# Patient Record
Sex: FEMALE | Race: WHITE
[De-identification: names, ages, dates, MRNs, and addresses within clinical notes are randomized per-mention and may not be internally consistent; named-entity substitution may affect disease eponyms.]

---

## 2022-09-07 ENCOUNTER — HOSPITAL ENCOUNTER (OUTPATIENT)
Dept: HOSPITAL 4 - SED | Age: 62
Setting detail: OBSERVATION
LOS: 1 days | Discharge: HOME | End: 2022-09-08
Attending: INTERNAL MEDICINE | Admitting: INTERNAL MEDICINE
Payer: COMMERCIAL

## 2022-09-07 VITALS — WEIGHT: 138 LBS | BODY MASS INDEX: 26.06 KG/M2 | HEIGHT: 61 IN

## 2022-09-07 VITALS — SYSTOLIC BLOOD PRESSURE: 150 MMHG

## 2022-09-07 DIAGNOSIS — Z20.822: ICD-10-CM

## 2022-09-07 DIAGNOSIS — R51.9: ICD-10-CM

## 2022-09-07 DIAGNOSIS — M31.6: ICD-10-CM

## 2022-09-07 DIAGNOSIS — R68.84: Primary | ICD-10-CM

## 2022-09-07 LAB
ALBUMIN SERPL BCP-MCNC: 3.7 G/DL (ref 3.4–4.8)
ALT SERPL W P-5'-P-CCNC: 19 U/L (ref 12–78)
ANION GAP SERPL CALCULATED.3IONS-SCNC: 8 MMOL/L (ref 5–15)
AST SERPL W P-5'-P-CCNC: 21 U/L (ref 10–37)
BASOPHILS # BLD AUTO: 0 K/UL (ref 0–0.2)
BASOPHILS NFR BLD AUTO: 0.8 % (ref 0–2)
BILIRUB SERPL-MCNC: 0.4 MG/DL (ref 0–1)
BUN SERPL-MCNC: 13 MG/DL (ref 8–21)
CALCIUM SERPL-MCNC: 8.9 MG/DL (ref 8.4–11)
CHLORIDE SERPL-SCNC: 105 MMOL/L (ref 98–107)
CREAT SERPL-MCNC: 0.89 MG/DL (ref 0.55–1.3)
EOSINOPHIL # BLD AUTO: 0 K/UL (ref 0–0.4)
EOSINOPHIL NFR BLD AUTO: 0.7 % (ref 0–4)
ERYTHROCYTE [DISTWIDTH] IN BLOOD BY AUTOMATED COUNT: 12.3 % (ref 9–15)
GFR SERPL CREATININE-BSD FRML MDRD: 83 ML/MIN (ref 90–?)
GLUCOSE SERPL-MCNC: 102 MG/DL (ref 70–99)
HCT VFR BLD AUTO: 36 % (ref 36–48)
HGB BLD-MCNC: 12.2 G/DL (ref 12–16)
LYMPHOCYTES # BLD AUTO: 0.9 K/UL (ref 1–5.5)
LYMPHOCYTES NFR BLD AUTO: 24.1 % (ref 20.5–51.5)
MCH RBC QN AUTO: 33 PG (ref 27–31)
MCHC RBC AUTO-ENTMCNC: 34 % (ref 32–36)
MCV RBC AUTO: 96 FL (ref 79–98)
MONOCYTES # BLD MANUAL: 0.4 K/UL (ref 0–1)
MONOCYTES # BLD MANUAL: 9 % (ref 1.7–9.3)
NEUTROPHILS # BLD AUTO: 2.6 K/UL (ref 1.8–7.7)
NEUTROPHILS NFR BLD AUTO: 65.4 % (ref 40–70)
PLATELET # BLD AUTO: 149 K/UL (ref 130–430)
POTASSIUM SERPL-SCNC: 4.6 MMOL/L (ref 3.5–5.1)
RBC # BLD AUTO: 3.74 MIL/UL (ref 4.2–6.2)
SODIUM SERPLBLD-SCNC: 141 MMOL/L (ref 136–145)
WBC # BLD AUTO: 3.9 K/UL (ref 4.8–10.8)

## 2022-09-07 PROCEDURE — 99285 EMERGENCY DEPT VISIT HI MDM: CPT

## 2022-09-07 PROCEDURE — 36415 COLL VENOUS BLD VENIPUNCTURE: CPT

## 2022-09-07 PROCEDURE — 93005 ELECTROCARDIOGRAM TRACING: CPT

## 2022-09-07 PROCEDURE — 87426 SARSCOV CORONAVIRUS AG IA: CPT

## 2022-09-07 PROCEDURE — 70487 CT MAXILLOFACIAL W/DYE: CPT

## 2022-09-07 PROCEDURE — 96365 THER/PROPH/DIAG IV INF INIT: CPT

## 2022-09-07 PROCEDURE — 80053 COMPREHEN METABOLIC PANEL: CPT

## 2022-09-07 PROCEDURE — 86140 C-REACTIVE PROTEIN: CPT

## 2022-09-07 PROCEDURE — 85025 COMPLETE CBC W/AUTO DIFF WBC: CPT

## 2022-09-07 PROCEDURE — 96376 TX/PRO/DX INJ SAME DRUG ADON: CPT

## 2022-09-07 PROCEDURE — 96372 THER/PROPH/DIAG INJ SC/IM: CPT

## 2022-09-07 PROCEDURE — 84484 ASSAY OF TROPONIN QUANT: CPT

## 2022-09-07 PROCEDURE — 96366 THER/PROPH/DIAG IV INF ADDON: CPT

## 2022-09-07 PROCEDURE — 70360 X-RAY EXAM OF NECK: CPT

## 2022-09-07 PROCEDURE — 85651 RBC SED RATE NONAUTOMATED: CPT

## 2022-09-07 PROCEDURE — 96375 TX/PRO/DX INJ NEW DRUG ADDON: CPT

## 2022-09-07 PROCEDURE — 70491 CT SOFT TISSUE NECK W/DYE: CPT

## 2022-09-07 PROCEDURE — 83880 ASSAY OF NATRIURETIC PEPTIDE: CPT

## 2022-09-07 PROCEDURE — G0378 HOSPITAL OBSERVATION PER HR: HCPCS

## 2022-09-07 PROCEDURE — 71045 X-RAY EXAM CHEST 1 VIEW: CPT

## 2022-09-07 PROCEDURE — 76376 3D RENDER W/INTRP POSTPROCES: CPT

## 2022-09-07 NOTE — NUR
Dr. Marie made aware of BP of 172/99. No new orders at the this time. Pt 
connected to monitor and safety precautions in place. Pt states her pain has 
subsided after pain medication was administered.

## 2022-09-07 NOTE — NUR
PT A&O x4, following commands, and safety precautions in place. Pt states she 
feels okay with slight pain but it is tolerable. VSS.

## 2022-09-07 NOTE — NUR
Placed in room 03  . Placed on cardiac monitor, blood pressure machine and 
pulse oximeter. To gown for exam. Side rails up.

Report given to JI ARAMBULA

## 2022-09-07 NOTE — NUR
-------------------------------------------------------------------------------

           *** Note undone in [facility] - 09/07/22 at 2324 by [id number] ***            

-------------------------------------------------------------------------------

Lab reported ESR of 52. Dr. Pan made aware.

## 2022-09-07 NOTE — NUR
-------------------------------------------------------------------------------

          *** Note undone in [facility] - 09/07/22 at 1940 by [email or web address] ***           

-------------------------------------------------------------------------------

Rec report from Kelly FERNANDEZ

## 2022-09-07 NOTE — NUR
Admit bed requested 



Patient will be admitted to care of Dr. Garcia.  

Admitted to med surg unit.

Diagnosis RULE OUT TEMPORAL ARTERITIS

Inpatient (Yes or No)  YES

Observation (Yes or No) YES

Orientation concerns or request close to nursing station  (Yes or No) NO

Covid Status NEGATIVE

On vent or bipap NO

Isolation requirements NO

Needs a sitter NO

From Home (Yes or if No enter name of facility) YES

Requires Dialysis (Yes or No) NO

Med Rec Completed (Yes of No) YES

## 2022-09-07 NOTE — NUR
----- Message from Roshan Martines III, MD sent at 7/10/2020  4:06 PM EDT -----  Luh- let's set up a telehealth visit to review,any afternoon Tues-Friday next week would be fine   PATIENT AMBULATORY TO ROOM 3 C/O LEFT JAW PAIN, SEEN BY EDP WITH ORDER LAMBERTO 
OUT. PATIENT AAOX4 SPEECH CLEAR AND COHERENT.

## 2022-09-08 VITALS — SYSTOLIC BLOOD PRESSURE: 124 MMHG

## 2022-09-08 VITALS — SYSTOLIC BLOOD PRESSURE: 100 MMHG

## 2022-09-08 VITALS — SYSTOLIC BLOOD PRESSURE: 120 MMHG

## 2022-09-08 VITALS — SYSTOLIC BLOOD PRESSURE: 148 MMHG

## 2022-09-08 LAB
ALBUMIN SERPL BCP-MCNC: 3.5 G/DL (ref 3.4–4.8)
ALT SERPL W P-5'-P-CCNC: 24 U/L (ref 12–78)
ANION GAP SERPL CALCULATED.3IONS-SCNC: 11 MMOL/L (ref 5–15)
AST SERPL W P-5'-P-CCNC: 19 U/L (ref 10–37)
BASOPHILS # BLD AUTO: 0 K/UL (ref 0–0.2)
BASOPHILS NFR BLD AUTO: 0.2 % (ref 0–2)
BILIRUB SERPL-MCNC: 0.4 MG/DL (ref 0–1)
BUN SERPL-MCNC: 15 MG/DL (ref 8–21)
CALCIUM SERPL-MCNC: 9.1 MG/DL (ref 8.4–11)
CHLORIDE SERPL-SCNC: 102 MMOL/L (ref 98–107)
CREAT SERPL-MCNC: 0.82 MG/DL (ref 0.55–1.3)
EOSINOPHIL # BLD AUTO: 0 K/UL (ref 0–0.4)
EOSINOPHIL NFR BLD AUTO: 0.1 % (ref 0–4)
ERYTHROCYTE [DISTWIDTH] IN BLOOD BY AUTOMATED COUNT: 12.4 % (ref 9–15)
GFR SERPL CREATININE-BSD FRML MDRD: 91 ML/MIN (ref 90–?)
GLUCOSE SERPL-MCNC: 159 MG/DL (ref 70–99)
HCT VFR BLD AUTO: 36.1 % (ref 36–48)
HGB BLD-MCNC: 12.2 G/DL (ref 12–16)
LYMPHOCYTES # BLD AUTO: 0.4 K/UL (ref 1–5.5)
LYMPHOCYTES NFR BLD AUTO: 14.9 % (ref 20.5–51.5)
MCH RBC QN AUTO: 32 PG (ref 27–31)
MCHC RBC AUTO-ENTMCNC: 34 % (ref 32–36)
MCV RBC AUTO: 95 FL (ref 79–98)
MONOCYTES # BLD MANUAL: 0 K/UL (ref 0–1)
MONOCYTES # BLD MANUAL: 1.3 % (ref 1.7–9.3)
NEUTROPHILS # BLD AUTO: 2.2 K/UL (ref 1.8–7.7)
NEUTROPHILS NFR BLD AUTO: 83.5 % (ref 40–70)
PLATELET # BLD AUTO: 157 K/UL (ref 130–430)
POTASSIUM SERPL-SCNC: 3.9 MMOL/L (ref 3.5–5.1)
RBC # BLD AUTO: 3.81 MIL/UL (ref 4.2–6.2)
SODIUM SERPLBLD-SCNC: 138 MMOL/L (ref 136–145)
WBC # BLD AUTO: 2.7 K/UL (ref 4.8–10.8)
WBC NRBC COR # BLD AUTO: 2.7 K/UL (ref 4.5–11)

## 2022-09-08 RX ADMIN — METHYLPREDNISOLONE SODIUM SUCCINATE SCH MLS/HR: 1 INJECTION, POWDER, FOR SOLUTION INTRAMUSCULAR; INTRAVENOUS at 09:00

## 2022-09-08 RX ADMIN — METHYLPREDNISOLONE SODIUM SUCCINATE SCH MLS/HR: 1 INJECTION, POWDER, FOR SOLUTION INTRAMUSCULAR; INTRAVENOUS at 10:38

## 2022-09-08 NOTE — NUR
Patient will be admitted to care of Dr. Garcia.  Admitted to med surg unit.  
Will go to room 109A.  Belongings list completed.  Complete and up to date 
summary report printed. SBAR report given to Domingo WORKMAN at bedside with 
opportunity for questions.

## 2022-09-14 NOTE — NUR
IV ADMINISTRATION END TIME (Observation Patients ONLY):

Late entry

IV infusion of Methylprenisolone Sodium Succinate/Dextrose started at 09:00 on 09/08/22 and 
ended at 11:00  09/08/22.